# Patient Record
Sex: FEMALE | Race: BLACK OR AFRICAN AMERICAN | NOT HISPANIC OR LATINO | Employment: FULL TIME | URBAN - METROPOLITAN AREA
[De-identification: names, ages, dates, MRNs, and addresses within clinical notes are randomized per-mention and may not be internally consistent; named-entity substitution may affect disease eponyms.]

---

## 2023-03-15 ENCOUNTER — LAB REQUISITION (OUTPATIENT)
Dept: LAB | Facility: CLINIC | Age: 23
End: 2023-03-15
Payer: COMMERCIAL

## 2023-03-15 DIAGNOSIS — Z01.419 ENCOUNTER FOR GYNECOLOGICAL EXAMINATION (GENERAL) (ROUTINE) WITHOUT ABNORMAL FINDINGS: ICD-10-CM

## 2023-03-15 LAB
ALBUMIN SERPL BCG-MCNC: 4.2 G/DL (ref 3.5–5.2)
ALP SERPL-CCNC: 91 U/L (ref 35–104)
ALT SERPL W P-5'-P-CCNC: 13 U/L (ref 10–35)
ANION GAP SERPL CALCULATED.3IONS-SCNC: 14 MMOL/L (ref 7–15)
AST SERPL W P-5'-P-CCNC: 21 U/L (ref 10–35)
BILIRUB SERPL-MCNC: 0.3 MG/DL
BUN SERPL-MCNC: 6.5 MG/DL (ref 6–20)
CALCIUM SERPL-MCNC: 8.7 MG/DL (ref 8.6–10)
CHLORIDE SERPL-SCNC: 103 MMOL/L (ref 98–107)
CHOLEST SERPL-MCNC: 134 MG/DL
CREAT SERPL-MCNC: 0.51 MG/DL (ref 0.51–0.95)
DEPRECATED HCO3 PLAS-SCNC: 22 MMOL/L (ref 22–29)
GFR SERPL CREATININE-BSD FRML MDRD: >90 ML/MIN/1.73M2
GLUCOSE SERPL-MCNC: 93 MG/DL (ref 70–99)
HDLC SERPL-MCNC: 57 MG/DL
LDLC SERPL CALC-MCNC: 63 MG/DL
NONHDLC SERPL-MCNC: 77 MG/DL
POTASSIUM SERPL-SCNC: 3.8 MMOL/L (ref 3.4–5.3)
PROT SERPL-MCNC: 7.3 G/DL (ref 6.4–8.3)
SODIUM SERPL-SCNC: 139 MMOL/L (ref 136–145)
T4 FREE SERPL-MCNC: 1.13 NG/DL (ref 0.9–1.7)
TRIGL SERPL-MCNC: 69 MG/DL
TSH SERPL DL<=0.005 MIU/L-ACNC: 4.27 UIU/ML (ref 0.3–4.2)

## 2023-03-15 PROCEDURE — 84439 ASSAY OF FREE THYROXINE: CPT | Mod: ORL

## 2023-03-15 PROCEDURE — 84443 ASSAY THYROID STIM HORMONE: CPT

## 2023-03-15 PROCEDURE — 86481 TB AG RESPONSE T-CELL SUSP: CPT | Mod: ORL

## 2023-03-15 PROCEDURE — 80061 LIPID PANEL: CPT | Mod: ORL

## 2023-03-15 PROCEDURE — 80053 COMPREHEN METABOLIC PANEL: CPT | Mod: ORL

## 2023-03-17 LAB
GAMMA INTERFERON BACKGROUND BLD IA-ACNC: 0.05 IU/ML
M TB IFN-G BLD-IMP: NEGATIVE
M TB IFN-G CD4+ BCKGRND COR BLD-ACNC: 9.95 IU/ML
MITOGEN IGNF BCKGRD COR BLD-ACNC: 0 IU/ML
MITOGEN IGNF BCKGRD COR BLD-ACNC: 0.02 IU/ML
QUANTIFERON MITOGEN: 10 IU/ML
QUANTIFERON NIL TUBE: 0.05 IU/ML
QUANTIFERON TB1 TUBE: 0.05 IU/ML
QUANTIFERON TB2 TUBE: 0.07

## 2023-06-28 ENCOUNTER — APPOINTMENT (OUTPATIENT)
Dept: CT IMAGING | Facility: CLINIC | Age: 23
End: 2023-06-28
Attending: EMERGENCY MEDICINE
Payer: COMMERCIAL

## 2023-06-28 ENCOUNTER — HOSPITAL ENCOUNTER (OUTPATIENT)
Facility: CLINIC | Age: 23
Setting detail: OBSERVATION
Discharge: HOME | End: 2023-06-28
Attending: EMERGENCY MEDICINE | Admitting: NURSE PRACTITIONER
Payer: COMMERCIAL

## 2023-06-28 ENCOUNTER — APPOINTMENT (OUTPATIENT)
Dept: CARDIOLOGY | Facility: CLINIC | Age: 23
End: 2023-06-28
Payer: COMMERCIAL

## 2023-06-28 VITALS
HEIGHT: 69 IN | BODY MASS INDEX: 17.03 KG/M2 | DIASTOLIC BLOOD PRESSURE: 87 MMHG | RESPIRATION RATE: 16 BRPM | TEMPERATURE: 98.5 F | SYSTOLIC BLOOD PRESSURE: 105 MMHG | HEART RATE: 82 BPM | WEIGHT: 115 LBS | OXYGEN SATURATION: 100 %

## 2023-06-28 DIAGNOSIS — R40.20 LOSS OF CONSCIOUSNESS (H): ICD-10-CM

## 2023-06-28 DIAGNOSIS — S00.511A ABRASION OF LIP, INITIAL ENCOUNTER: ICD-10-CM

## 2023-06-28 DIAGNOSIS — R00.0 TACHYCARDIA, UNSPECIFIED: ICD-10-CM

## 2023-06-28 DIAGNOSIS — R55 SYNCOPE AND COLLAPSE: ICD-10-CM

## 2023-06-28 LAB
ALBUMIN SERPL BCG-MCNC: 4.8 G/DL (ref 3.5–5.2)
ALP SERPL-CCNC: 105 U/L (ref 35–104)
ALT SERPL W P-5'-P-CCNC: 22 U/L (ref 0–50)
ANION GAP SERPL CALCULATED.3IONS-SCNC: 21 MMOL/L (ref 7–15)
ANION GAP SERPL CALCULATED.3IONS-SCNC: 8 MMOL/L (ref 7–15)
AST SERPL W P-5'-P-CCNC: 23 U/L (ref 0–45)
ATRIAL RATE - MUSE: 113 BPM
B-OH-BUTYR SERPL-SCNC: <0.18 MMOL/L
BASOPHILS # BLD AUTO: 0 10E3/UL (ref 0–0.2)
BASOPHILS NFR BLD AUTO: 0 %
BILIRUB SERPL-MCNC: 0.4 MG/DL
BUN SERPL-MCNC: 10.4 MG/DL (ref 6–20)
BUN SERPL-MCNC: 12.5 MG/DL (ref 6–20)
CALCIUM SERPL-MCNC: 8.1 MG/DL (ref 8.6–10)
CALCIUM SERPL-MCNC: 9.2 MG/DL (ref 8.6–10)
CHLORIDE SERPL-SCNC: 100 MMOL/L (ref 98–107)
CHLORIDE SERPL-SCNC: 107 MMOL/L (ref 98–107)
CREAT SERPL-MCNC: 0.59 MG/DL (ref 0.51–0.95)
CREAT SERPL-MCNC: 0.64 MG/DL (ref 0.51–0.95)
DEPRECATED HCO3 PLAS-SCNC: 17 MMOL/L (ref 22–29)
DEPRECATED HCO3 PLAS-SCNC: 23 MMOL/L (ref 22–29)
DIASTOLIC BLOOD PRESSURE - MUSE: NORMAL MMHG
EOSINOPHIL # BLD AUTO: 0 10E3/UL (ref 0–0.7)
EOSINOPHIL NFR BLD AUTO: 0 %
ERYTHROCYTE [DISTWIDTH] IN BLOOD BY AUTOMATED COUNT: 13.2 % (ref 10–15)
GFR SERPL CREATININE-BSD FRML MDRD: >90 ML/MIN/1.73M2
GFR SERPL CREATININE-BSD FRML MDRD: >90 ML/MIN/1.73M2
GLUCOSE BLDC GLUCOMTR-MCNC: 105 MG/DL (ref 70–99)
GLUCOSE SERPL-MCNC: 105 MG/DL (ref 70–99)
GLUCOSE SERPL-MCNC: 84 MG/DL (ref 70–99)
HCG SERPL QL: NEGATIVE
HCT VFR BLD AUTO: 44.1 % (ref 35–47)
HGB BLD-MCNC: 14.4 G/DL (ref 11.7–15.7)
HOLD SPECIMEN: NORMAL
HOLD SPECIMEN: NORMAL
IMM GRANULOCYTES # BLD: 0 10E3/UL
IMM GRANULOCYTES NFR BLD: 0 %
INTERPRETATION ECG - MUSE: NORMAL
LACTATE SERPL-SCNC: 1.9 MMOL/L (ref 0.7–2)
LVEF ECHO: NORMAL
LYMPHOCYTES # BLD AUTO: 2.8 10E3/UL (ref 0.8–5.3)
LYMPHOCYTES NFR BLD AUTO: 32 %
MAGNESIUM SERPL-MCNC: 1.9 MG/DL (ref 1.7–2.3)
MCH RBC QN AUTO: 29.3 PG (ref 26.5–33)
MCHC RBC AUTO-ENTMCNC: 32.7 G/DL (ref 31.5–36.5)
MCV RBC AUTO: 90 FL (ref 78–100)
MONOCYTES # BLD AUTO: 0.4 10E3/UL (ref 0–1.3)
MONOCYTES NFR BLD AUTO: 4 %
NEUTROPHILS # BLD AUTO: 5.5 10E3/UL (ref 1.6–8.3)
NEUTROPHILS NFR BLD AUTO: 64 %
NRBC # BLD AUTO: 0 10E3/UL
NRBC BLD AUTO-RTO: 0 /100
P AXIS - MUSE: 70 DEGREES
PLATELET # BLD AUTO: 225 10E3/UL (ref 150–450)
POTASSIUM SERPL-SCNC: 3.7 MMOL/L (ref 3.4–5.3)
POTASSIUM SERPL-SCNC: 3.8 MMOL/L (ref 3.4–5.3)
PR INTERVAL - MUSE: 132 MS
PROT SERPL-MCNC: 8.1 G/DL (ref 6.4–8.3)
QRS DURATION - MUSE: 76 MS
QT - MUSE: 330 MS
QTC - MUSE: 452 MS
R AXIS - MUSE: 70 DEGREES
RBC # BLD AUTO: 4.92 10E6/UL (ref 3.8–5.2)
SODIUM SERPL-SCNC: 138 MMOL/L (ref 136–145)
SODIUM SERPL-SCNC: 138 MMOL/L (ref 136–145)
SYSTOLIC BLOOD PRESSURE - MUSE: NORMAL MMHG
T AXIS - MUSE: 62 DEGREES
TROPONIN T SERPL HS-MCNC: <6 NG/L
VENTRICULAR RATE- MUSE: 113 BPM
WBC # BLD AUTO: 8.7 10E3/UL (ref 4–11)

## 2023-06-28 PROCEDURE — G0378 HOSPITAL OBSERVATION PER HR: HCPCS

## 2023-06-28 PROCEDURE — 82010 KETONE BODYS QUAN: CPT | Performed by: EMERGENCY MEDICINE

## 2023-06-28 PROCEDURE — 258N000003 HC RX IP 258 OP 636: Performed by: EMERGENCY MEDICINE

## 2023-06-28 PROCEDURE — 36415 COLL VENOUS BLD VENIPUNCTURE: CPT | Performed by: EMERGENCY MEDICINE

## 2023-06-28 PROCEDURE — 83735 ASSAY OF MAGNESIUM: CPT | Performed by: EMERGENCY MEDICINE

## 2023-06-28 PROCEDURE — 83605 ASSAY OF LACTIC ACID: CPT | Performed by: EMERGENCY MEDICINE

## 2023-06-28 PROCEDURE — 99222 1ST HOSP IP/OBS MODERATE 55: CPT | Performed by: PSYCHIATRY & NEUROLOGY

## 2023-06-28 PROCEDURE — 99284 EMERGENCY DEPT VISIT MOD MDM: CPT | Mod: 25 | Performed by: EMERGENCY MEDICINE

## 2023-06-28 PROCEDURE — 93010 ELECTROCARDIOGRAM REPORT: CPT | Performed by: EMERGENCY MEDICINE

## 2023-06-28 PROCEDURE — 84484 ASSAY OF TROPONIN QUANT: CPT | Performed by: EMERGENCY MEDICINE

## 2023-06-28 PROCEDURE — 85025 COMPLETE CBC W/AUTO DIFF WBC: CPT | Performed by: EMERGENCY MEDICINE

## 2023-06-28 PROCEDURE — 250N000013 HC RX MED GY IP 250 OP 250 PS 637: Performed by: FAMILY MEDICINE

## 2023-06-28 PROCEDURE — 93005 ELECTROCARDIOGRAM TRACING: CPT | Performed by: EMERGENCY MEDICINE

## 2023-06-28 PROCEDURE — 80053 COMPREHEN METABOLIC PANEL: CPT | Performed by: EMERGENCY MEDICINE

## 2023-06-28 PROCEDURE — 82962 GLUCOSE BLOOD TEST: CPT

## 2023-06-28 PROCEDURE — 96361 HYDRATE IV INFUSION ADD-ON: CPT | Performed by: EMERGENCY MEDICINE

## 2023-06-28 PROCEDURE — 70450 CT HEAD/BRAIN W/O DYE: CPT

## 2023-06-28 PROCEDURE — 93306 TTE W/DOPPLER COMPLETE: CPT

## 2023-06-28 PROCEDURE — 96360 HYDRATION IV INFUSION INIT: CPT | Performed by: EMERGENCY MEDICINE

## 2023-06-28 PROCEDURE — 93306 TTE W/DOPPLER COMPLETE: CPT | Mod: 26 | Performed by: INTERNAL MEDICINE

## 2023-06-28 PROCEDURE — 99285 EMERGENCY DEPT VISIT HI MDM: CPT | Mod: 25 | Performed by: EMERGENCY MEDICINE

## 2023-06-28 PROCEDURE — 84703 CHORIONIC GONADOTROPIN ASSAY: CPT | Performed by: EMERGENCY MEDICINE

## 2023-06-28 RX ORDER — ACETAMINOPHEN 500 MG
1000 TABLET ORAL ONCE
Status: COMPLETED | OUTPATIENT
Start: 2023-06-28 | End: 2023-06-28

## 2023-06-28 RX ORDER — ACETAMINOPHEN 650 MG/1
650 SUPPOSITORY RECTAL EVERY 4 HOURS PRN
Status: CANCELLED | OUTPATIENT
Start: 2023-06-28

## 2023-06-28 RX ORDER — ACETAMINOPHEN 325 MG/1
650 TABLET ORAL EVERY 4 HOURS PRN
Status: CANCELLED | OUTPATIENT
Start: 2023-06-28

## 2023-06-28 RX ORDER — SODIUM CHLORIDE 9 MG/ML
INJECTION, SOLUTION INTRAVENOUS CONTINUOUS
Status: CANCELLED | OUTPATIENT
Start: 2023-06-28

## 2023-06-28 RX ORDER — NITROGLYCERIN 0.4 MG/1
0.4 TABLET SUBLINGUAL EVERY 5 MIN PRN
Status: CANCELLED | OUTPATIENT
Start: 2023-06-28

## 2023-06-28 RX ORDER — ONDANSETRON 4 MG/1
4 TABLET, ORALLY DISINTEGRATING ORAL EVERY 6 HOURS PRN
Status: CANCELLED | OUTPATIENT
Start: 2023-06-28

## 2023-06-28 RX ORDER — LIDOCAINE 40 MG/G
CREAM TOPICAL
Status: CANCELLED | OUTPATIENT
Start: 2023-06-28

## 2023-06-28 RX ORDER — ONDANSETRON 2 MG/ML
4 INJECTION INTRAMUSCULAR; INTRAVENOUS EVERY 6 HOURS PRN
Status: CANCELLED | OUTPATIENT
Start: 2023-06-28

## 2023-06-28 RX ADMIN — SODIUM CHLORIDE 1000 ML: 9 INJECTION, SOLUTION INTRAVENOUS at 03:26

## 2023-06-28 RX ADMIN — ACETAMINOPHEN 1000 MG: 500 TABLET ORAL at 13:22

## 2023-06-28 ASSESSMENT — ACTIVITIES OF DAILY LIVING (ADL)
ADLS_ACUITY_SCORE: 35

## 2023-06-28 NOTE — ED PROVIDER NOTES
"     Emergency Department Patient Sign-out       Brief HPI:  This is a 23 year old female signed out to me by Dr. Hurtado .  See initial ED Provider note for details of the presentation.            Significant Events prior to my assuming care: 23-year-old female who presented on the earlier shift due to an episode of transient loss of consciousness of undefined etiology.  Work-up to date is negative.  Plan for the patient to be admitted to the ED observation unit for telemetry, echocardiogram and neurology consult.  She is boarding in the [place] ED while awaiting transfer to the [facility] ED observation unit.      Exam:   Patient Vitals for the past 24 hrs:   BP Temp Temp src Pulse Resp SpO2 Height Weight   06/28/23 0700 -- -- -- 85 15 100 % -- --   06/28/23 0655 98/59 -- -- 85 14 100 % -- --   06/28/23 0650 -- -- -- 87 15 100 % -- --   06/28/23 0645 -- -- -- 89 15 100 % -- --   06/28/23 0640 98/61 -- -- 85 14 100 % -- --   06/28/23 0635 -- -- -- 86 14 100 % -- --   06/28/23 0630 -- -- -- 89 14 100 % -- --   06/28/23 0625 101/59 -- -- 86 14 100 % -- --   06/28/23 0615 (!) 125/116 -- -- 91 15 100 % -- --   06/28/23 0438 107/70 -- -- 97 18 99 % -- --   06/28/23 0423 112/77 -- -- 91 15 100 % -- --   06/28/23 0345 108/80 -- -- 88 17 90 % -- --   06/28/23 0327 110/75 -- -- 90 17 100 % -- --   06/28/23 0234 -- 98.5  F (36.9  C) Oral -- -- -- -- --   06/28/23 0231 -- -- -- -- -- -- 1.753 m (5' 9\") 52.2 kg (115 lb)   06/28/23 0229 -- -- -- 113 21 -- -- --   06/28/23 0224 114/51 -- -- (!) 147 18 98 % -- --   06/28/23 0219 120/81 -- -- 114 18 -- -- --           ED RESULTS:   Results for orders placed or performed during the hospital encounter of 06/28/23 (from the past 24 hour(s))   Glucose by meter     Status: Abnormal    Collection Time: 06/28/23  2:15 AM   Result Value Ref Range    GLUCOSE BY METER POCT 105 (H) 70 - 99 mg/dL   [place] Draw     Status: None (In process)    Collection Time: 06/28/23  2:18 AM    Narrative    The " following orders were created for panel order [place] Draw.  Procedure                               Abnormality         Status                     ---------                               -----------         ------                     Extra Blue Top Tube[[id number]]                              Final result               Extra Red Top Tube[[id number]]                                                          Extra Green Top (Lithium...[[id number]]                                                 Extra Purple Top Tube[[id number]]                                                         Please view results for these tests on the individual orders.   Extra Blue Top Tube     Status: None    Collection Time: 06/28/23  2:18 AM   Result Value Ref Range    Hold Specimen [facility]    CBC with platelets differential     Status: None    Collection Time: 06/28/23  2:19 AM    Narrative    The following orders were created for panel order CBC with platelets differential.  Procedure                               Abnormality         Status                     ---------                               -----------         ------                     CBC with platelets and d...[[id number]]                      Final result                 Please view results for these tests on the individual orders.   Comprehensive metabolic panel     Status: Abnormal    Collection Time: 06/28/23  2:19 AM   Result Value Ref Range    Sodium 138 136 - 145 mmol/L    Potassium 3.7 3.4 - 5.3 mmol/L    Chloride 100 98 - 107 mmol/L    Carbon Dioxide (CO2) 17 (L) 22 - 29 mmol/L    Anion Gap 21 (H) 7 - 15 mmol/L    Urea Nitrogen 12.5 6.0 - 20.0 mg/dL    Creatinine 0.64 0.51 - 0.95 mg/dL    Calcium 9.2 8.6 - 10.0 mg/dL    Glucose 105 (H) 70 - 99 mg/dL    Alkaline Phosphatase 105 (H) 35 - 104 U/L    AST 23 0 - 45 U/L    ALT 22 0 - 50 U/L    Protein Total 8.1 6.4 - 8.3 g/dL    Albumin 4.8 3.5 - 5.2 g/dL    Bilirubin Total 0.4 <=1.2 mg/dL    GFR Estimate >90 >60 mL/min/1.73m2    Magnesium     Status: Normal    Collection Time: 06/28/23  2:19 AM   Result Value Ref Range    Magnesium 1.9 1.7 - 2.3 mg/dL   HCG qualitative pregnancy (blood)     Status: Normal    Collection Time: 06/28/23  2:19 AM   Result Value Ref Range    hCG Serum Qualitative Negative Negative   Troponin T, High Sensitivity     Status: Normal    Collection Time: 06/28/23  2:19 AM   Result Value Ref Range    Troponin T, High Sensitivity <6 <=14 ng/L   CBC with platelets and differential     Status: None    Collection Time: 06/28/23  2:19 AM   Result Value Ref Range    WBC Count 8.7 4.0 - 11.0 10e3/uL    RBC Count 4.92 3.80 - 5.20 10e6/uL    Hemoglobin 14.4 11.7 - 15.7 g/dL    Hematocrit 44.1 35.0 - 47.0 %    MCV 90 78 - 100 fL    MCH 29.3 26.5 - 33.0 pg    MCHC 32.7 31.5 - 36.5 g/dL    RDW 13.2 10.0 - 15.0 %    Platelet Count 225 150 - 450 10e3/uL    % Neutrophils 64 %    % Lymphocytes 32 %    % Monocytes 4 %    % Eosinophils 0 %    % Basophils 0 %    % Immature Granulocytes 0 %    NRBCs per 100 WBC 0 <1 /100    Absolute Neutrophils 5.5 1.6 - 8.3 10e3/uL    Absolute Lymphocytes 2.8 0.8 - 5.3 10e3/uL    Absolute Monocytes 0.4 0.0 - 1.3 10e3/uL    Absolute Eosinophils 0.0 0.0 - 0.7 10e3/uL    Absolute Basophils 0.0 0.0 - 0.2 10e3/uL    Absolute Immature Granulocytes 0.0 <=0.4 10e3/uL    Absolute NRBCs 0.0 10e3/uL   Ketone Beta-Hydroxybutyrate Quantitative     Status: Normal    Collection Time: 06/28/23  2:19 AM   Result Value Ref Range    Ketone (Beta-Hydroxybutyrate) Quantitative <0.18 <=0.30 mmol/L   EKG 12 lead     Status: None    Collection Time: 06/28/23  2:27 AM   Result Value Ref Range    Systolic Blood Pressure  mmHg    Diastolic Blood Pressure  mmHg    Ventricular Rate 113 BPM    Atrial Rate 113 BPM    [place] Interval 132 ms    QRS Duration 76 ms     ms    QTc 452 ms    P Axis 70 degrees    R AXIS 70 degrees    T Axis 62 degrees    Interpretation ECG       Sinus tachycardia  Possible Left atrial  enlargement  Nonspecific ST abnormality  Abnormal ECG  Unconfirmed report - interpretation of this ECG is computer generated - see medical record for final interpretation  Confirmed by - EMERGENCY ROOM, PHYSICIAN ([id number]),  BYRON ABDALLA ([id number]) on 6/28/2023 7:09:22 AM     Head CT w/o contrast     Status: None    Collection Time: 06/28/23  2:59 AM    Narrative    EXAM: CT HEAD W/O CONTRAST  LOCATION: [facility]  DATE: 6/28/2023    INDICATION: Loss of consciousness. Head trauma.  COMPARISON: None.  TECHNIQUE: Routine CT Head without IV contrast. Multiplanar reformats. Dose reduction techniques were used.    FINDINGS:  INTRACRANIAL CONTENTS: No definite acute intracranial hemorrhage, extra-axial fluid collection or mass effect. Subtle hyperdensity along the extra-axial space at the anterior/inferior right lateral frontal lobe (image 13 and 14 series 4) is favored to be   related to streak artifact. No CT evidence of acute infarct. Normal parenchymal attenuation. Normal ventricles and sulci.     VISUALIZED ORBITS/SINUSES/MASTOIDS: No intraorbital abnormality. No paranasal sinus mucosal disease. No middle ear or mastoid effusion.    BONES/SOFT TISSUES: No acute abnormality.      Impression    IMPRESSION:  1.  No definite acute intracranial process.   Lactic acid whole blood     Status: Normal    Collection Time: 06/28/23  3:20 AM   Result Value Ref Range    Lactic Acid 1.9 0.7 - 2.0 mmol/L   Extra Tube     Status: None    Collection Time: 06/28/23  4:42 AM    Narrative    The following orders were created for panel order Extra Tube.  Procedure                               Abnormality         Status                     ---------                               -----------         ------                     Extra Purple Top Tube[[id number]]                            Final result                 Please view results for these tests on the individual orders.   Extra Purple  Top Tube     Status: None    Collection Time: 06/28/23  4:42 AM   Result Value Ref Range    Hold Specimen [facility]    Basic metabolic panel     Status: Abnormal    Collection Time: 06/28/23  4:55 AM   Result Value Ref Range    Sodium 138 136 - 145 mmol/L    Potassium 3.8 3.4 - 5.3 mmol/L    Chloride 107 98 - 107 mmol/L    Carbon Dioxide (CO2) 23 22 - 29 mmol/L    Anion Gap 8 7 - 15 mmol/L    Urea Nitrogen 10.4 6.0 - 20.0 mg/dL    Creatinine 0.59 0.51 - 0.95 mg/dL    Calcium 8.1 (L) 8.6 - 10.0 mg/dL    Glucose 84 70 - 99 mg/dL    GFR Estimate >90 >60 mL/min/1.73m2       ED MEDICATIONS:   Medications   0.9% sodium chloride BOLUS (0 mLs Intravenous Stopped 6/28/23 6074)         Impression:    ICD-10-CM    1. Loss of consciousness (H)  R40.20           Plan:    Pending studies include none.        MD Collette Mcfadden David, MD  06/28/23 5749

## 2023-06-28 NOTE — CONSULTS
CC:   Chief Complaint   Patient presents with     Loss of Consciousness     Pt unable to recall what happened, woke up on the floor, feeling weak. Pt reports history of anemia. Bruise noted on left cheek and lips       HPI:  Ms. Gabe Thomas is a 23 year old woman who neurology was asked to see due to concern of seizure versus syncope.  She has no known past medical history other than recurring strep throat.    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 She had no preceding symptoms she remembers. There were apparently people around her, but no report is available from bystanders. When EMS arrived, she was pale, diffusely weak, and somewhat confused. She has now recovered to her baseline.    She reports 100 pound weight loss that is intentional over the past 1-2 years. She reports little PO intake and works on her feet. She has weekly if not daily events of lightheadedness when transitioning from a seated to a standing position, sometimes she requires holding onto a nearby object for stabilization while she feels faint. She has never had orthostatic vital signs.    She denies any history of tonic-clonic movements. She never has been told to have had a seizure, and reports no febrile seizures. She has had no history of head trauma, neurosurgeries or brain/meningeal infections. She does, however, have a sister with idiopathic epilepsy.    She denies any recent changes in her daily routine, other than recent sleep deprivation. She denies any herbal substances, alternative medications, street recreational drug use, or any regular medication use other than Tylenol.    She denies any current headache, numbness, tingling, weakness, slurred speech, double vision, or difficulty with balance.      MEDS:  No current outpatient medications on file.         PROBLEM LIST:  Patient Active Problem List   Diagnosis     Loss of consciousness (H)        PMHx:  History reviewed. No pertinent past medical history.    FHx:  Sister with idiopathic epilepsy.    SHx:  Per HPI.    Physical Exam:  Neuro: Alert, awake, normal conversant and logical speech. No dysarthria. Able to stand, sit and walk without assistance. Normal optics discs. No ptosis. Equal sized pupils with equal and normal reactivity to light. Normal peripheral visual fields without visual simultagnosia. Normal saccadic initiation, velocity and amplitude. Normal appendicular muscle tone. Normal appendicular deep tendon reflexes. Normal vibratory sense in the distal limbs. Normal finger-to-nose and heel-to-shin. No tremor.    I obtained orthostatic vital signs while sitting, standing at 1 and 5 minutes, which were without any positional drop in BP or elevation in HR. This was obtained after she ate and drank.    Objective Testing:  I personally reviewed the head CT, which is unremarkable and shows no structural lesions to explain her event of loss of consciousness.  Pertinent labs: no leukocytosis; no lactic acidosis    Assessment/Plan:  Unclear whether the spell represented a seizure or a syncopal episode.   She certainly has regularly occurring lightheadedness associated with a 100 pound weight loss (reportedly intentional) and decreased PO intake, and there is a report that she appeared pale when EMS arrived. Thus, orthostasis and loss of consciousness is in my opinion the leading item on the differential. However, it is impossible to rule out seizure, and she has a family history of a 1st degree relative with idiopathic epilepsy, making seizure a possibility. She is back to her baseline and has a normal neurological exam, thus outpatient work-up with a routine EEG is a reasonable next step.     Neurology will sign off but please let me know if any other questions arise.    Patient was given standard [place] driving  restriction instructions in patients with undetermined cause of LOC.    Today, I spent 65 minutes reviewing the chart, personally assessing objective testing, direct patient care, completing documentation and billing.

## 2023-06-28 NOTE — ED NOTES
Patient was discharged with paperwork and work note, explained mychart to patient. Vitals stable. No questions or concerns.

## 2023-06-28 NOTE — ED PROVIDER NOTES
Emergency Department Patient Sign-out       Brief HPI and ED course:  Patient is a 23 year old female signed out to me by the previous physician.  See initial ED Provider note for details of the presentation. In brief, 23-year-old female who initially presented to the emergency department due to a syncopal event/Loss of consciousness of undefined etiology was boarding in the ED for evaluation with telemetry, pending echocardiogram and neurology consult.  Echocardiogram was obtained which was normal, and just after signout to myself, neurology evaluated the patient at bedside.    They believe this is more likely loss of consciousness due to syncope rather than a seizure.  However patient does have a history of epilepsy in her sister.  They do recommend outpatient neurology follow-up, but at this point in time do not believe she needs to stay in the hospital any further.  Patient is feeling better and requesting discharge home.  She does have an abrasion on her lip but no other significant traumatic injuries that need to be addressed at this time.  No sutures required in her lip abrasion.    I have concerns that some of this is due to her significant weight loss in the last year.  Patient endorses an intentional 100 pound weight loss.  She is on the thinner side in general at this point, and I do have concerns that she could be having some over restrictive eating.  Discussed this with the patient and she states her understanding and states she will start eating some more.  We will plan for her to follow-up with primary care as she does not have 1, and I have concerns with how significant this weight loss has been for possible disordered eating    Vitals:   Patient Vitals for the past 24 hrs:   BP Temp Temp src Pulse Resp SpO2 Height Weight   06/28/23 1400 -- -- -- 75 13 -- -- --   06/28/23 1230 102/68 -- -- 83 16 100 % -- --   06/28/23 1115 -- -- -- 75 13 100 % -- --   06/28/23 0745 110/60 -- -- 91 14 100 % -- --  "  06/28/23 0700 -- -- -- 85 15 100 % -- --   06/28/23 0655 98/59 -- -- 85 14 100 % -- --   06/28/23 0650 -- -- -- 87 15 100 % -- --   06/28/23 0645 -- -- -- 89 15 100 % -- --   06/28/23 0640 98/61 -- -- 85 14 100 % -- --   06/28/23 0635 -- -- -- 86 14 100 % -- --   06/28/23 0630 -- -- -- 89 14 100 % -- --   06/28/23 0625 101/59 -- -- 86 14 100 % -- --   06/28/23 0615 (!) 125/116 -- -- 91 15 100 % -- --   06/28/23 0438 107/70 -- -- 97 18 99 % -- --   06/28/23 0423 112/77 -- -- 91 15 100 % -- --   06/28/23 0345 108/80 -- -- 88 17 90 % -- --   06/28/23 0327 110/75 -- -- 90 17 100 % -- --   06/28/23 0234 -- 98.5  F (36.9  C) Oral -- -- -- -- --   06/28/23 0231 -- -- -- -- -- -- 1.753 m (5' 9\") 52.2 kg (115 lb)   06/28/23 0229 -- -- -- 113 21 -- -- --   06/28/23 0224 114/51 -- -- (!) 147 18 98 % -- --   06/28/23 0219 120/81 -- -- 114 18 -- -- --     --  Sue Yusuf MD   Emergency Medicine       Sue Yusuf MD  06/28/23 0734    "

## 2023-06-28 NOTE — LETTER
[facility] EMERGENCY DEPARTMENT  [address]  [facility] [address]  [phone]      June 28, 2023      To Whom it may concern:    Gabe Thomas was seen in our Emergency Department today, June 28, 2023 for an injury that was reported to be work related.      For the next 1 days she should not work    The employee might be taking medication so that they cannot operate moving machinery or perform activities that require balancing or working above ground.      Sincerely,    El Allen RN

## 2023-06-28 NOTE — ED PROVIDER NOTES
ED Provider Note  [facility]      History     Chief Complaint   Patient presents with     Loss of Consciousness     Pt unable to recall what happened, woke up on the floor, feeling weak. Pt reports history of anemia. 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  She states that she was feeling her normal state of health, working, and the next thing she knew she woke up with a bunch of people around her asking for a wheelchair.  She states that she did not have any warning that she was going to fall down.  She does not recall for sure what happened.  She did not lose continence.  The provider that responded noted that she seemed confused at the scene, seemed weak, was unable to stand on her own.  She states that she is got a little bit of pain in her left lower lip and left cheek, but denies any blurred vision, trouble with her speech, numbness, tingling, weakness.  Denies any chest pain, shortness of breath, cough, fever, abdominal pain, nausea, vomiting, diarrhea.  No neck or back pain.  No extremity pain.  She reports she has passed out before, and she believes it is related to anemia.    Per chart review, Tetanus is UTD.    Past Medical History  History reviewed. No pertinent past medical history.  History reviewed. No pertinent surgical history.  No current outpatient medications on file.    No Known Allergies  Family History  History reviewed. No pertinent family history.  Social History   Social History     Tobacco Use     Smoking status: Never     Smokeless tobacco: Never   Substance Use Topics     Alcohol use: Never     Drug use: Never         A medically appropriate review of systems was performed with pertinent positives and negatives noted in the HPI, and all other systems negative.    Physical Exam   BP:  "120/81  Pulse: 114  Temp: 98.5  F (36.9  C)  Resp: 18  Height: 175.3 cm (5' 9\")  Weight: 52.2 kg (115 lb)  SpO2: 98 %  Physical Exam  Constitutional:       General: She is not in acute distress.     Appearance: Normal appearance. She is not toxic-appearing.   HENT:      Head:        Mouth/Throat:      Mouth: Mucous membranes are moist.   Eyes:      General: No scleral icterus.     Conjunctiva/sclera: Conjunctivae normal.   Neck:      Comments: No midline C/T/L spine tenderness  Cardiovascular:      Rate and Rhythm: Normal rate.      Heart sounds: Normal heart sounds.   Pulmonary:      Effort: No respiratory distress.      Breath sounds: Normal breath sounds.   Abdominal:      Palpations: Abdomen is soft.      Tenderness: There is no abdominal tenderness.   Musculoskeletal:         General: No deformity.      Cervical back: Neck supple.   Skin:     General: Skin is warm.      Findings: No rash.   Neurological:      General: No focal deficit present.      Mental Status: She is alert and oriented to person, place, and time.      Cranial Nerves: No cranial nerve deficit.      Sensory: No sensory deficit.      Motor: No weakness.         ED Course, Procedures, & Data      Procedures       ED Course Selections:        EKG Interpretation:      Interpreted by Aishwarya Hurtado MD  Time reviewed: 0230  Symptoms at time of EKG: None   Rhythm: sinus tachycardia  Rate: 113  Axis: Normal  Ectopy: none  Conduction: normal  ST Segments/ T Waves: Non-specific ST wave changes  Q Waves: none  Comparison to prior: No old EKG available    Clinical Impression: NSR with non-specific ST changes                           Results for orders placed or performed during the hospital encounter of 06/28/23   Head CT w/o contrast     Status: None    Narrative    EXAM: CT HEAD W/O CONTRAST  LOCATION: [facility]  DATE: 6/28/2023    INDICATION: Loss of consciousness. Head trauma.  COMPARISON: " None.  TECHNIQUE: Routine CT Head without IV contrast. Multiplanar reformats. Dose reduction techniques were used.    FINDINGS:  INTRACRANIAL CONTENTS: No definite acute intracranial hemorrhage, extra-axial fluid collection or mass effect. Subtle hyperdensity along the extra-axial space at the anterior/inferior right lateral frontal lobe (image 13 and 14 series 4) is favored to be   related to streak artifact. No CT evidence of acute infarct. Normal parenchymal attenuation. Normal ventricles and sulci.     VISUALIZED ORBITS/SINUSES/MASTOIDS: No intraorbital abnormality. No paranasal sinus mucosal disease. No middle ear or mastoid effusion.    BONES/SOFT TISSUES: No acute abnormality.      Impression    IMPRESSION:  1.  No definite acute intracranial process.   [place] Draw     Status: None (In process)    Narrative    The following orders were created for panel order [place] Draw.  Procedure                               Abnormality         Status                     ---------                               -----------         ------                     Extra Blue Top Tube[[id number]]                              Final result               Extra Red Top Tube[[id number]]                                                          Extra Green Top (Lithium...[[id number]]                                                 Extra Purple Top Tube[[id number]]                                                         Please view results for these tests on the individual orders.   Comprehensive metabolic panel     Status: Abnormal   Result Value Ref Range    Sodium 138 136 - 145 mmol/L    Potassium 3.7 3.4 - 5.3 mmol/L    Chloride 100 98 - 107 mmol/L    Carbon Dioxide (CO2) 17 (L) 22 - 29 mmol/L    Anion Gap 21 (H) 7 - 15 mmol/L    Urea Nitrogen 12.5 6.0 - 20.0 mg/dL    Creatinine 0.64 0.51 - 0.95 mg/dL    Calcium 9.2 8.6 - 10.0 mg/dL    Glucose 105 (H) 70 - 99 mg/dL    Alkaline Phosphatase 105 (H) 35 - 104 U/L    AST 23 0 - 45 U/L    ALT  22 0 - 50 U/L    Protein Total 8.1 6.4 - 8.3 g/dL    Albumin 4.8 3.5 - 5.2 g/dL    Bilirubin Total 0.4 <=1.2 mg/dL    GFR Estimate >90 >60 mL/min/1.73m2   Magnesium     Status: Normal   Result Value Ref Range    Magnesium 1.9 1.7 - 2.3 mg/dL   HCG qualitative pregnancy (blood)     Status: Normal   Result Value Ref Range    hCG Serum Qualitative Negative Negative   Troponin T, High Sensitivity     Status: Normal   Result Value Ref Range    Troponin T, High Sensitivity <6 <=14 ng/L   Extra Blue Top Tube     Status: None   Result Value Ref Range    Hold Specimen [facility]    CBC with platelets and differential     Status: None   Result Value Ref Range    WBC Count 8.7 4.0 - 11.0 10e3/uL    RBC Count 4.92 3.80 - 5.20 10e6/uL    Hemoglobin 14.4 11.7 - 15.7 g/dL    Hematocrit 44.1 35.0 - 47.0 %    MCV 90 78 - 100 fL    MCH 29.3 26.5 - 33.0 pg    MCHC 32.7 31.5 - 36.5 g/dL    RDW 13.2 10.0 - 15.0 %    Platelet Count 225 150 - 450 10e3/uL    % Neutrophils 64 %    % Lymphocytes 32 %    % Monocytes 4 %    % Eosinophils 0 %    % Basophils 0 %    % Immature Granulocytes 0 %    NRBCs per 100 WBC 0 <1 /100    Absolute Neutrophils 5.5 1.6 - 8.3 10e3/uL    Absolute Lymphocytes 2.8 0.8 - 5.3 10e3/uL    Absolute Monocytes 0.4 0.0 - 1.3 10e3/uL    Absolute Eosinophils 0.0 0.0 - 0.7 10e3/uL    Absolute Basophils 0.0 0.0 - 0.2 10e3/uL    Absolute Immature Granulocytes 0.0 <=0.4 10e3/uL    Absolute NRBCs 0.0 10e3/uL   Glucose by meter     Status: Abnormal   Result Value Ref Range    GLUCOSE BY METER POCT 105 (H) 70 - 99 mg/dL   Lactic acid whole blood     Status: Normal   Result Value Ref Range    Lactic Acid 1.9 0.7 - 2.0 mmol/L   Ketone Beta-Hydroxybutyrate Quantitative     Status: Normal   Result Value Ref Range    Ketone (Beta-Hydroxybutyrate) Quantitative <0.18 <=0.30 mmol/L   Basic metabolic panel     Status: Abnormal   Result Value Ref Range    Sodium 138 136 - 145 mmol/L    Potassium 3.8 3.4 - 5.3 mmol/L    Chloride 107 98 - 107 mmol/L     Carbon Dioxide (CO2) 23 22 - 29 mmol/L    Anion Gap 8 7 - 15 mmol/L    Urea Nitrogen 10.4 6.0 - 20.0 mg/dL    Creatinine 0.59 0.51 - 0.95 mg/dL    Calcium 8.1 (L) 8.6 - 10.0 mg/dL    Glucose 84 70 - 99 mg/dL    GFR Estimate >90 >60 mL/min/1.73m2   Extra Tube     Status: None    Narrative    The following orders were created for panel order Extra Tube.  Procedure                               Abnormality         Status                     ---------                               -----------         ------                     Extra Purple Top Tube[[id number]]                            Final result                 Please view results for these tests on the individual orders.   Extra Purple Top Tube     Status: None   Result Value Ref Range    Hold Specimen [facility]    EKG 12 lead     Status: None (Preliminary result)   Result Value Ref Range    Systolic Blood Pressure  mmHg    Diastolic Blood Pressure  mmHg    Ventricular Rate 113 BPM    Atrial Rate 113 BPM    IL Interval 132 ms    QRS Duration 76 ms     ms    QTc 452 ms    P Axis 70 degrees    R AXIS 70 degrees    T Axis 62 degrees    Interpretation ECG       Sinus tachycardia  Possible Left atrial enlargement  Nonspecific ST abnormality  Abnormal ECG     CBC with platelets differential     Status: None    Narrative    The following orders were created for panel order CBC with platelets differential.  Procedure                               Abnormality         Status                     ---------                               -----------         ------                     CBC with platelets and d...[[id number]]                      Final result                 Please view results for these tests on the individual orders.     Medications   0.9% sodium chloride BOLUS (0 mLs Intravenous Stopped 6/28/23 0579)     Labs Ordered and Resulted from Time of ED Arrival to Time of ED Departure   COMPREHENSIVE METABOLIC PANEL - Abnormal       Result Value    Sodium 138       Potassium 3.7      Chloride 100      Carbon Dioxide (CO2) 17 (*)     Anion Gap 21 (*)     Urea Nitrogen 12.5      Creatinine 0.64      Calcium 9.2      Glucose 105 (*)     Alkaline Phosphatase 105 (*)     AST 23      ALT 22      Protein Total 8.1      Albumin 4.8      Bilirubin Total 0.4      GFR Estimate >90     GLUCOSE BY METER - Abnormal    GLUCOSE BY METER POCT 105 (*)    BASIC METABOLIC PANEL - Abnormal    Sodium 138      Potassium 3.8      Chloride 107      Carbon Dioxide (CO2) 23      Anion Gap 8      Urea Nitrogen 10.4      Creatinine 0.59      Calcium 8.1 (*)     Glucose 84      GFR Estimate >90     MAGNESIUM - Normal    Magnesium 1.9     HCG QUALITATIVE PREGNANCY - Normal    hCG Serum Qualitative Negative     TROPONIN T, HIGH SENSITIVITY - Normal    Troponin T, High Sensitivity <6     LACTIC ACID WHOLE BLOOD - Normal    Lactic Acid 1.9     KETONE BETA-HYDROXYBUTYRATE QUANTITATIVE, RAPID - Normal    Ketone (Beta-Hydroxybutyrate) Quantitative <0.18     CBC WITH PLATELETS AND DIFFERENTIAL    WBC Count 8.7      RBC Count 4.92      Hemoglobin 14.4      Hematocrit 44.1      MCV 90      MCH 29.3      MCHC 32.7      RDW 13.2      Platelet Count 225      % Neutrophils 64      % Lymphocytes 32      % Monocytes 4      % Eosinophils 0      % Basophils 0      % Immature Granulocytes 0      NRBCs per 100 WBC 0      Absolute Neutrophils 5.5      Absolute Lymphocytes 2.8      Absolute Monocytes 0.4      Absolute Eosinophils 0.0      Absolute Basophils 0.0      Absolute Immature Granulocytes 0.0      Absolute NRBCs 0.0       Head CT w/o contrast   Final Result   IMPRESSION:   1.  No definite acute intracranial process.             Critical care was not performed.     Medical Decision Making  The patient's presentation was of moderate complexity (an acute illness with systemic symptoms).    The patient's evaluation involved:  an assessment requiring an independent historian (Rapid response provider)  ordering and/or review  of 3+ test(s) in this encounter (see separate area of note for details)    The patient's management necessitated high risk (a decision regarding hospitalization).      Assessment & Plan    Plan the patient's arrival in the ED she was mildly tachycardic, though this resolved over time with a little bit of fluid.  EKG was obtained which shows sinus tachycardia with nonspecific ST changes, no clear evidence for acute ischemia.  She had no chest symptoms such as chest pain or shortness of breath at all.  Given the lack of the symptoms and normal blood pressure here, resolution of tachycardia, my concern for PE is low.  She does have some swelling to the left side of her face with only mild discomfort.  No obvious dental injury.  Head CT was done and was negative.  No sign of any significant injury at this time.  Although this certainly could have represented a syncopal episode, given the report from the responding provider to the rapid response, the patient seemed quite confused initially, seemed weak and appeared unwell-was unable to get up on her own.  She was concerned that the patient may be postictal based on her appearance.  Indeed, initial bicarb was low and anion gap was high.  I did order a lactate subsequently, though there was a delay in drawing this, and was high normal by the time it came back.  Ketones were normal, and repeat BMP showed normal bicarb and anion gap.  I do suspect her lactate was high initially, again, concerned she may have had a seizure.  We will bring her into observation for an echocardiogram, telemetry monitoring, and neurology evaluation.  Nothing to suggest infection or acute surgical abnormality at this time.    Dictation Disclaimer: Some of this Note has been completed with voice-recognition dictation software. Although errors are generally corrected real-time, there is the potential for a rare error to be present in the completed chart.      I have reviewed the nursing notes. I  have reviewed the findings, diagnosis, plan and need for follow up with the patient.    New Prescriptions    No medications on file       Final diagnoses:   Loss of consciousness (H)       Aishwarya Hurtado  [facility] EMERGENCY DEPARTMENT  6/28/2023     Aishwarya Hurtado MD  06/28/23 0708

## 2023-06-28 NOTE — DISCHARGE INSTRUCTIONS
You were seen in the emergency department due to an episode of syncope/loss of consciousness.  We believe that this is due to you passing out, but it could be due to a seizure although this is less likely.    We recommend that you follow-up with neurology outside of the hospital, and a primary care doctor to discuss further your significant weight loss in the last year.    We do recommend that you eat more as we do have concerns that some of your dizziness is related to your low weight and low food intake.  Return to the emergency department with any worsening severe symptoms including any recurrent passing out, chest pain, shortness of breath or any other concerning symptoms    Please make an appointment to follow up with [facility] (phone: [phone]) in 7-14 days even if entirely better.

## 2023-06-28 NOTE — PHARMACY-ADMISSION MEDICATION HISTORY
Admission Medication History Completed by Pharmacy    See [facility] Admission Navigator for allergy information, preferred outpatient pharmacy, prior to admission medications and immunization status.     Medication history sources:  Surescripts; chart review; patient     Pertinent changes made to PTA medication list:  Added: none  Deleted: N/A  Changed: none    Additional medication history information:   - Patient denies taking/being prescribed prescription medications and over-the-counter (OTC) products such as vitamins, sleep aids, etc.        Prior to Admission medications    Not on File        Date completed: 06/28/23    Medication history completed by:   Tati Marion, PharmD  *[id number]

## 2023-06-28 NOTE — PROGRESS NOTES
Rapid Response Team Note    Assessment   In assessment a rapid response was called on Gabe Thomas 23 yr old female due to reported fall/syncopal episode. 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 Upon my arrival she was sitting up in the chair with small laceration to her lower lip and bruise and scrape left side of her face. She was visibly weak. Her speech was slightly slurred, and appeared pale. She was able to stand only with assistance, and was weak needing help to get to the wheelchair.     The patient denied any medical issues other than anemia, but she did say that this had happened before. She reports she does not take iron, get iron infusions or blood transfusions. She denies any aura, or warning that this was coming and stated she felt totally fine prior to this event tonight.     She states she has mild chest pain, blurry vision, and Dizziness. Denies neck, or back pain. She did not loose function of bowel or bladder.  She does not currently take any medications.     Exam  Appearing lethargic, thin, and slightly pale   Visibly weak, unsteady gait   Pupils are equal bilateral and reactive, she did appear on my arrival to have a slightly disconjugate gaze and endorsed blurry vision. Sensation intact, moves all extremities. A&O x4.    Small laceration to the left lower lip, bruise, and scrap on the left cheek area     Plan   -  Transfer to the ER via wheelchair   -  Reassessment and plan follow-up will be performed by the Emergency room team      DUSTY Hightower CNP  [facility] Paging/Directory    Hospital Course     Sepsis Evaluation   The patient is not known to have an infection.  NO EVIDENCE OF SEPSIS at this time.  Vital sign, physical exam, and lab findings are due to Tracy Pascual .

## 2023-06-28 NOTE — ED TRIAGE NOTES
Pt at work, unable to recall incident prior to falling on the ground. Pt woke up, feeling weak, bruise on left cheek and lips     Triage Assessment     Row Name 06/28/23 0231       Triage Assessment (Adult)    Airway WDL WDL       Respiratory WDL    Respiratory WDL WDL       Skin Circulation/Temperature WDL    Skin Circulation/Temperature WDL WDL       Cardiac WDL    Cardiac WDL WDL       Peripheral/Neurovascular WDL    Peripheral Neurovascular WDL WDL       Cognitive/Neuro/Behavioral WDL    Cognitive/Neuro/Behavioral WDL WDL

## 2023-08-17 ENCOUNTER — HOSPITAL ENCOUNTER (EMERGENCY)
Facility: CLINIC | Age: 23
Discharge: HOME | End: 2023-08-17
Attending: EMERGENCY MEDICINE | Admitting: EMERGENCY MEDICINE
Payer: COMMERCIAL

## 2023-08-17 VITALS
SYSTOLIC BLOOD PRESSURE: 119 MMHG | WEIGHT: 128 LBS | TEMPERATURE: 98.4 F | HEIGHT: 69 IN | HEART RATE: 78 BPM | BODY MASS INDEX: 18.96 KG/M2 | OXYGEN SATURATION: 99 % | DIASTOLIC BLOOD PRESSURE: 79 MMHG | RESPIRATION RATE: 18 BRPM

## 2023-08-17 DIAGNOSIS — J02.9 VIRAL PHARYNGITIS: ICD-10-CM

## 2023-08-17 LAB — GROUP A STREP BY PCR: NOT DETECTED

## 2023-08-17 PROCEDURE — 99283 EMERGENCY DEPT VISIT LOW MDM: CPT | Performed by: EMERGENCY MEDICINE

## 2023-08-17 PROCEDURE — 87651 STREP A DNA AMP PROBE: CPT | Performed by: EMERGENCY MEDICINE

## 2023-08-17 RX ORDER — IBUPROFEN 400 MG/1
400 TABLET, FILM COATED ORAL
COMMUNITY
Start: 2023-03-22

## 2023-08-17 ASSESSMENT — ENCOUNTER SYMPTOMS: SORE THROAT: 1

## 2023-08-17 NOTE — ED TRIAGE NOTES
Patient presents due to sore throat with difficulty swallowing for the past 2 days; accompanied by swelling.     Triage Assessment       Row Name 08/17/23 1707       Triage Assessment (Adult)    Airway WDL WDL       Respiratory WDL    Respiratory WDL WDL       Skin Circulation/Temperature WDL    Skin Circulation/Temperature WDL WDL       Cardiac WDL    Cardiac WDL WDL       Peripheral/Neurovascular WDL    Peripheral Neurovascular WDL WDL       Cognitive/Neuro/Behavioral WDL    Cognitive/Neuro/Behavioral WDL WDL

## 2023-08-17 NOTE — DISCHARGE INSTRUCTIONS
Please make an appointment to follow up with Your Primary Care Provider in 5 days if not improving.

## 2023-08-17 NOTE — Clinical Note
Gabe Thomas was seen and treated in our emergency department on 8/17/2023.  She may return to work on 08/18/2023.       If you have any questions or concerns, please don't hesitate to call.      Dave Butler MD

## 2023-08-17 NOTE — ED PROVIDER NOTES
[facility] EMERGENCY DEPARTMENT ([facility])    8/17/23      ED PROVIDER NOTE   History     Chief Complaint   Patient presents with    Pharyngitis     Patient presents due to sore throat with difficulty swallowing for the past 2 days; accompanied by swelling.     The history is provided by the patient and medical records.     Gabe Thomas is a 23 year old female who presents with a sore throat that she has had for the last 2 days associated with some sinus congestion and runny nose.  Patient denies any productive cough or shortness of breath.  Patient states that she may have had a fever initially with her sore throat but now does not.  Patient states it still hurts when she swallows.  The patient denies vomiting, diarrhea, melena or bright blood per rectum.    Epic records reviewed.  She has had recurrent strep tonsillitis in the past with multiple episodes of strep a year.  She has been seen by [facility] to discuss a tonsillectomy and possible adenoidectomy with this.    I have reviewed the Medications, Allergies, Past Medical and Surgical History, and Social History in the Epic system.  History reviewed. No pertinent past medical history.    History reviewed. No pertinent surgical history.     Review of your medicines        UNREVIEWED medicines. Ask your doctor about these medicines        Dose / Directions   ibuprofen 400 MG tablet  Commonly known as: ADVIL/MOTRIN      Dose: 400 mg  Take 400 mg by mouth  Refills: 0            START taking        Dose / Directions   Cepastat 5-6-10 MG Lozg  Generic drug: DM-Benzocaine-Menthol      Dose: 1 lozenge  Take 1 lozenge by mouth every 2 hours as needed (sore throat)  Quantity: 15 lozenge  Refills: 0               Where to get your medicines        Some of these will need a paper prescription and others can be bought over the counter. Ask your nurse if you have questions.    Bring a paper prescription for each of these medications  Cepastat 5-6-10 MG Lozg    "      Past medical history, past surgical history, medications, and allergies were reviewed with the patient. Additional pertinent items: None    History reviewed. No pertinent family history.    Social History     Tobacco Use    Smoking status: Never    Smokeless tobacco: Never   Substance Use Topics    Alcohol use: Never     Social history was reviewed with the patient. Additional pertinent items: None    No Known Allergies    Review of Systems   HENT:  Positive for sore throat.      A complete review of systems was performed with pertinent positives and negatives noted in the HPI, and all other systems negative.    Physical Exam   BP: 119/79  Pulse: 78  Temp: 98.4  F (36.9  C)  Resp: 18  Height: 175.3 cm (5' 9\")  Weight: 58.1 kg (128 lb)  SpO2: 99 %      Physical Exam  Constitutional:       Appearance: She is not ill-appearing or diaphoretic.   HENT:      Head: Atraumatic.      Comments: No sinus tenderness to percussion     Mouth/Throat:      Pharynx: Posterior oropharyngeal erythema present.   Eyes:      Pupils: Pupils are equal, round, and reactive to light.   Neck:      Comments: Mild superior anterior cervical adenopathy  Pulmonary:      Effort: No respiratory distress.   Musculoskeletal:      Cervical back: Neck supple.   Neurological:      General: No focal deficit present.      Mental Status: She is alert and oriented to person, place, and time.   Psychiatric:         Mood and Affect: Mood normal.         ED Course        Procedures                   Results for orders placed or performed during the hospital encounter of 08/17/23 (from the past 24 hour(s))   Group A Streptococcus PCR Throat Swab    Specimen: Throat; Swab   Result Value Ref Range    Group A strep by PCR Not Detected Not Detected    Narrative    The Xpert Xpress Strep A test, performed on the [employer], is a rapid, qualitative in vitro diagnostic test for the detection of Streptococcus pyogenes (Group A ß-hemolytic " Streptococcus, Strep A) in throat swab specimens from patients with signs and symptoms of pharyngitis. The Xpert Xpress Strep A test can be used as an aid in the diagnosis of Group A Streptococcal pharyngitis. The assay is not intended to monitor treatment for Group A Streptococcus infections. The Xpert Xpress Strep A test utilizes an automated real-time polymerase chain reaction (PCR) to detect Streptococcus pyogenes DNA.              Critical care was not performed.     Medical Decision Making  The patient's presentation was of low complexity (an acute and uncomplicated illness or injury).    The patient's evaluation involved:  ordering and/or review of 1 test(s) in this encounter (see above)    The patient's management necessitated only low risk treatment.      Assessments & Plan (with Medical Decision Making)     I have reviewed the nursing notes.    Medications - No data to display     I have reviewed the findings, diagnosis, plan and need for follow up with the patient.    Discharge Medication List as of 8/17/2023  5:56 PM        START taking these medications    Details   DM-Benzocaine-Menthol (CEPASTAT) 5-6-10 MG LOZG Take 1 lozenge by mouth every 2 hours as needed (sore throat), Disp-15 lozenge, R-0, Local Print             Final diagnoses:   Viral pharyngitis     Please make an appointment to follow up with Your Primary Care Provider in 5 days if not improving.    Routine discharge instructions were given for this diagnosis      DANYELL CAMPBELL MD    8/17/2023   [facility] EMERGENCY DEPARTMENT       Danyell Campbell MD  08/17/23 3599

## 2024-05-19 ENCOUNTER — HEALTH MAINTENANCE LETTER (OUTPATIENT)
Age: 24
End: 2024-05-19

## 2024-08-20 NOTE — PROGRESS NOTES
I am seeing this patient in consultation for tonsillar hypertrophy at the request of self.    Chief Complaint - tonsillar hypertrophy    History of Present Illness - Gabe Thomas is a 24 year old female with tonsillar hypertrophy and recurrent acute tonsillitis. The patient provides the history. They describe bouts of significant sore throat with swelling of the tonsils. This happens multiple times per year, and has been going on for 4 years. Infrequent heartburn. Snores. Sleeps okay. No personal or family history of bleeding disorders. Nonsmoker.     Tests personally reviewed today for this visit:   1.) 8/17/23 strep was negative  2.) 8/2023 CBC normal    Past Medical History -   Patient Active Problem List   Diagnosis    Loss of consciousness (H)       Current Medications -   Current Outpatient Medications:     DM-Benzocaine-Menthol (CEPASTAT) 5-6-10 MG LOZG, Take 1 lozenge by mouth every 2 hours as needed (sore throat), Disp: 15 lozenge, Rfl: 0    ibuprofen (ADVIL/MOTRIN) 400 MG tablet, Take 400 mg by mouth, Disp: , Rfl:     Allergies - No Known Allergies    Social History -   Social History     Socioeconomic History    Marital status: Single   Tobacco Use    Smoking status: Never    Smokeless tobacco: Never   Substance and Sexual Activity    Alcohol use: Never    Drug use: Never    Sexual activity: Not Currently     Social Determinants of Health     Financial Resource Strain: Not on File (8/25/2019)    Received from [employer]    Financial Resource Strain     Financial Resource Strain: 0   Food Insecurity: Not on File (8/25/2019)    Received from [employer]    Food Insecurity     Food: 0   Transportation Needs: Not on File (8/25/2019)    Received from [employer]    Transportation Needs     Transportation: 0   Physical Activity: Not on File (8/25/2019)    Received from [employer]    Physical Activity     Physical Activity: 0   Stress: Not on File (8/25/2019)    Received from [employer]    Stress     Stress: 0    Received from [employer] [facility] [employer] [facility]    [employer]   Housing Stability: Not on File (2019)    Received from [facility]    [facility]     [employer]       Family History - see HPI, no bleeding disorders    Review of Systems - As per HPI and PMHx, otherwise 10+ comprehensive system review is negative.    Physical Exam  General - The patient is in no distress.  Alert and oriented, answers questions and cooperates with examination appropriately.   Voice and Breathing - The patient was breathing comfortably without the use of accessory muscles. There was no wheezing, stridor, or stertor.  The patients voice was clear and strong.  Mouth - Examination of the oral cavity showed pink, healthy oral mucosa. No lesions or ulcerations noted.  The tongue was mobile and protrudes midline.  Oropharynx - The walls of the oropharynx were smooth, pink, moist, symmetric, and had no lesions or ulcerations.  The tonsils were 2+, cryptic, no erythema today. The uvula was midline and the palate raised symmetrically.   Neck -  Soft, non-tender. Palpation of the occipital, submental, submandibular, internal jugular chain, and supraclavicular nodes did not demonstrate any abnormal lymph nodes or masses. The parotid glands were without masses. Palpation of the thyroid was soft and smooth, with no nodules or goiter appreciated.  The trachea was midline.    A/P -     ICD-10-CM    1. Acute recurrent streptococcal tonsillitis  J03.01 Case Request: TONSILLECTOMY AND POSSIBLE ADENOIDECTOMY      2. Tonsillar hypertrophy  J35.1 Case Request: TONSILLECTOMY AND POSSIBLE ADENOIDECTOMY          Gabe Thomas is a 24 year old female with recurrent acute tonsillitis. I recommend tonsillectomy, possible adenoidectomy. The remainder of the visit was spent discussing the procedure.    I explained the risks, benefits, and alternatives of tonsillectomy including, but not, limited to: bleeding, possible need to go back to the OR to control bleeding, blood  transfusion, pain, and that tonsillectomy will not cure sore throats secondary to other causes such as viral upper respiratory infection. I also discussed the risks of general anesthesia. I also explained the likely need for narcotic (opioid) pain medication that increases the risk of dependency. The patient will need to wean off the medication as soon as possible, and Tylenol and ibuprofen medication are preferred. They agree and wish to proceed. The surgical schedulers will call the patient.     Shahab Melendez MD  Otolaryngology  [facility]

## 2024-09-03 ENCOUNTER — OFFICE VISIT (OUTPATIENT)
Dept: OTOLARYNGOLOGY | Facility: CLINIC | Age: 24
End: 2024-09-03
Payer: COMMERCIAL

## 2024-09-03 VITALS
OXYGEN SATURATION: 98 % | HEART RATE: 70 BPM | RESPIRATION RATE: 16 BRPM | SYSTOLIC BLOOD PRESSURE: 111 MMHG | DIASTOLIC BLOOD PRESSURE: 77 MMHG

## 2024-09-03 DIAGNOSIS — J03.01 ACUTE RECURRENT STREPTOCOCCAL TONSILLITIS: Primary | ICD-10-CM

## 2024-09-03 DIAGNOSIS — J35.1 TONSILLAR HYPERTROPHY: ICD-10-CM

## 2024-09-03 PROCEDURE — 99204 OFFICE O/P NEW MOD 45 MIN: CPT | Performed by: OTOLARYNGOLOGY

## 2024-09-03 NOTE — LETTER
9/3/2024      Gabe Thomas  [address]  [place] [address]      Dear Colleague,    Thank you for referring your patient, Gabe Thomas, to the [facility]. Please see a copy of my visit note below.    I am seeing this patient in consultation for tonsillar hypertrophy at the request of self.    Chief Complaint - tonsillar hypertrophy    History of Present Illness - Gabe Thomas is a 24 year old female with tonsillar hypertrophy and recurrent acute tonsillitis. The patient provides the history. They describe bouts of significant sore throat with swelling of the tonsils. This happens multiple times per year, and has been going on for 4 years. Infrequent heartburn. Snores. Sleeps okay. No personal or family history of bleeding disorders. Nonsmoker.     Tests personally reviewed today for this visit:   1.) 8/17/23 strep was negative  2.) 8/2023 CBC normal    Past Medical History -   Patient Active Problem List   Diagnosis     Loss of consciousness (H)       Current Medications -   Current Outpatient Medications:      DM-Benzocaine-Menthol (CEPASTAT) 5-6-10 MG LOZG, Take 1 lozenge by mouth every 2 hours as needed (sore throat), Disp: 15 lozenge, Rfl: 0     ibuprofen (ADVIL/MOTRIN) 400 MG tablet, Take 400 mg by mouth, Disp: , Rfl:     Allergies - No Known Allergies    Social History -   Social History     Socioeconomic History     Marital status: Single   Tobacco Use     Smoking status: Never     Smokeless tobacco: Never   Substance and Sexual Activity     Alcohol use: Never     Drug use: Never     Sexual activity: Not Currently     Social Determinants of Health     Financial Resource Strain: Not on File (8/25/2019)    Received from [employer]    Financial Resource Strain      Financial Resource Strain: 0   Food Insecurity: Not on File (8/25/2019)    Received from [employer]    Food Insecurity      Food: 0   Transportation Needs: Not on File (8/25/2019)    Received from [employer]    Transportation Needs       Transportation: 0   Physical Activity: Not on File (2019)    Received from [employer]    Physical Activity      Physical Activity: 0   Stress: Not on File (2019)    Received from [employer]    Stress      Stress: 0    [facility]   Housing Stability: Not on File (2019)    Received from [employer]    Housing Stability      [employer]       Family History - see HPI, no bleeding disorders    Review of Systems - As per HPI and PMHx, otherwise 10+ comprehensive system review is negative.    Physical Exam  General - The patient is in no distress.  Alert and oriented, answers questions and cooperates with examination appropriately.   Voice and Breathing - The patient was breathing comfortably without the use of accessory muscles. There was no wheezing, stridor, or stertor.  The patients voice was clear and strong.  Mouth - Examination of the oral cavity showed pink, healthy oral mucosa. No lesions or ulcerations noted.  The tongue was mobile and protrudes midline.  Oropharynx - The walls of the oropharynx were smooth, pink, moist, symmetric, and had no lesions or ulcerations.  The tonsils were 2+, cryptic, no erythema today. The uvula was midline and the palate raised symmetrically.   Neck -  Soft, non-tender. Palpation of the occipital, submental, submandibular, internal jugular chain, and supraclavicular nodes did not demonstrate any abnormal lymph nodes or masses. The parotid glands were without masses. Palpation of the thyroid was soft and smooth, with no nodules or goiter appreciated.  The trachea was midline.    A/P -     ICD-10-CM    1. Acute recurrent streptococcal tonsillitis  J03.01 Case Request: TONSILLECTOMY AND POSSIBLE ADENOIDECTOMY      2. Tonsillar hypertrophy  J35.1 Case Request: TONSILLECTOMY AND POSSIBLE ADENOIDECTOMY          Gabe Thomas is a 24 year old female with recurrent acute tonsillitis. I recommend tonsillectomy, possible  adenoidectomy. The remainder of the visit was spent discussing the procedure.    I explained the risks, benefits, and alternatives of tonsillectomy including, but not, limited to: bleeding, possible need to go back to the OR to control bleeding, blood transfusion, pain, and that tonsillectomy will not cure sore throats secondary to other causes such as viral upper respiratory infection. I also discussed the risks of general anesthesia. I also explained the likely need for narcotic (opioid) pain medication that increases the risk of dependency. The patient will need to wean off the medication as soon as possible, and Tylenol and ibuprofen medication are preferred. They agree and wish to proceed. The surgical schedulers will call the patient.     Shahab Melendez MD  Otolaryngology  [facility]            Again, thank you for allowing me to participate in the care of your patient.        Sincerely,        Shahab Melendez MD

## 2024-09-04 ENCOUNTER — TELEPHONE (OUTPATIENT)
Dept: OTOLARYNGOLOGY | Facility: CLINIC | Age: 24
End: 2024-09-04
Payer: COMMERCIAL

## 2024-09-09 ENCOUNTER — HOSPITAL ENCOUNTER (OUTPATIENT)
Facility: AMBULATORY SURGERY CENTER | Age: 24
End: 2024-09-09
Attending: OTOLARYNGOLOGY | Admitting: OTOLARYNGOLOGY
Payer: COMMERCIAL

## 2024-09-09 PROBLEM — J35.1 TONSILLAR HYPERTROPHY: Status: ACTIVE | Noted: 2024-09-03

## 2024-09-09 PROBLEM — J03.01 ACUTE RECURRENT STREPTOCOCCAL TONSILLITIS: Status: ACTIVE | Noted: 2024-09-03

## 2024-09-09 NOTE — TELEPHONE ENCOUNTER
Type of surgery: T & A  Location of surgery: [facility]  Date and time of surgery: 10-25-24  Surgeon: Al  Pre-Op Appt Date:   Post-Op Appt Date:    Packet sent out: Yes  Pre-cert/Authorization completed:    Date:

## 2024-09-10 ENCOUNTER — HOSPITAL ENCOUNTER (EMERGENCY)
Facility: CLINIC | Age: 24
Discharge: HOME | End: 2024-09-10
Attending: EMERGENCY MEDICINE | Admitting: EMERGENCY MEDICINE
Payer: COMMERCIAL

## 2024-09-10 VITALS
RESPIRATION RATE: 16 BRPM | OXYGEN SATURATION: 99 % | HEART RATE: 79 BPM | TEMPERATURE: 98.2 F | SYSTOLIC BLOOD PRESSURE: 104 MMHG | BODY MASS INDEX: 18.9 KG/M2 | WEIGHT: 128 LBS | DIASTOLIC BLOOD PRESSURE: 73 MMHG

## 2024-09-10 DIAGNOSIS — S00.35XA: ICD-10-CM

## 2024-09-10 PROCEDURE — 99282 EMERGENCY DEPT VISIT SF MDM: CPT | Performed by: EMERGENCY MEDICINE

## 2024-09-10 RX ORDER — ESCITALOPRAM OXALATE 20 MG/1
1 TABLET ORAL
COMMUNITY
Start: 2024-07-08

## 2024-09-10 ASSESSMENT — COLUMBIA-SUICIDE SEVERITY RATING SCALE - C-SSRS
2. HAVE YOU ACTUALLY HAD ANY THOUGHTS OF KILLING YOURSELF IN THE PAST MONTH?: NO
6. HAVE YOU EVER DONE ANYTHING, STARTED TO DO ANYTHING, OR PREPARED TO DO ANYTHING TO END YOUR LIFE?: NO
1. IN THE PAST MONTH, HAVE YOU WISHED YOU WERE DEAD OR WISHED YOU COULD GO TO SLEEP AND NOT WAKE UP?: NO

## 2024-09-10 ASSESSMENT — ACTIVITIES OF DAILY LIVING (ADL): ADLS_ACUITY_SCORE: 33

## 2024-09-10 NOTE — ED TRIAGE NOTES
Triage Assessment (Adult)       Row Name 09/10/24 8912          Triage Assessment    Airway WDL WDL        Respiratory WDL    Respiratory WDL WDL        Skin Circulation/Temperature WDL    Skin Circulation/Temperature WDL WDL        Cardiac WDL    Cardiac WDL WDL        Peripheral/Neurovascular WDL    Peripheral Neurovascular WDL WDL        Cognitive/Neuro/Behavioral WDL    Cognitive/Neuro/Behavioral WDL WDL

## 2024-09-10 NOTE — ED PROVIDER NOTES
[facility] EMERGENCY DEPARTMENT ([facility])    9/10/24      ED PROVIDER NOTE    History     Chief Complaint   Patient presents with    Foreign Body in Nose     Pt here to have nose ring removed. R nostril nose ring embeded in skin.  unable to remove.      HPI  Gabe Thomas is a 24 year old female who presents to the ED with complaints of a retained nose ring.  The patient states that for the last week, she has been unable to remove her right sided nose piercing.  She states that the mucosal surface is no longer palpable or visible.  She attempted to see a  today who referred her to the emergency department as a tool were unable to remove the nose ring.  She denies any swelling, fever, or drainage.    Past Medical History  No past medical history on file.  No past surgical history on file.  escitalopram (LEXAPRO) 20 MG tablet  DM-Benzocaine-Menthol (CEPASTAT) 5-6-10 MG LOZG  ibuprofen (ADVIL/MOTRIN) 400 MG tablet      No Known Allergies  Family History  No family history on file.  Social History   Social History     Tobacco Use    Smoking status: Never    Smokeless tobacco: Never   Substance Use Topics    Alcohol use: Never    Drug use: Never      Past medical history, past surgical history, medications, allergies, family history, and social history were reviewed with the patient. No additional pertinent items.     A medically appropriate review of systems was performed with pertinent positives and negatives noted in the HPI, and all other systems negative.    Physical Exam   BP: 104/73  Pulse: 79  Temp: 98.2  F (36.8  C)  Resp: 16  Weight: 58.1 kg (128 lb)  SpO2: 99 %  Physical Exam  Vitals and nursing note reviewed.   Constitutional:       Appearance: Normal appearance.   HENT:      Nose:     Neurological:      Mental Status: She is alert.           ED Course, Procedures, & Data      Procedures                No results found for any visits on 09/10/24.  Medications - No data to display  Labs  Ordered and Resulted from Time of ED Arrival to Time of ED Departure - No data to display  No orders to display          Critical care was not performed.     Medical Decision Making  The patient's presentation was of straightforward complexity (a clearly self-limited or minor problem).    The patient's evaluation involved:  history and exam without other MDM data elements    The patient's management necessitated only low risk treatment.    Assessment & Plan    24 year old female to the emergency department with embedded nose ring.  The mucosal post and the backer are not visible despite physical manipulation of the external components of the nose ring.  Unable to remove here in the emergency department with available equipment.  Will refer to ENT for evaluation and management.  Return precautions provided.    I have reviewed the nursing notes. I have reviewed the findings, diagnosis, plan and need for follow up with the patient.    Discharge Medication List as of 9/10/2024  6:15 PM          Final diagnoses:   Foreign body in skin of nose       Chart documentation was completed with Dragon voice-recognition software. Even though reviewed, this chart may still contain some grammatical, spelling, and word errors.     [facility] EMERGENCY DEPARTMENT  9/10/2024     Demetris Funk MD  09/10/24 3525

## 2024-09-10 NOTE — DISCHARGE INSTRUCTIONS
Follow-up with ENT for nose ring removal.    [facility] (phone: [phone]).     Return if redness, swelling, drainage, fever, or other concerns.

## 2025-04-23 ENCOUNTER — HOSPITAL ENCOUNTER (EMERGENCY)
Facility: CLINIC | Age: 25
Discharge: HOME | End: 2025-04-23
Attending: FAMILY MEDICINE | Admitting: FAMILY MEDICINE
Payer: COMMERCIAL

## 2025-04-23 VITALS
WEIGHT: 150.8 LBS | OXYGEN SATURATION: 100 % | TEMPERATURE: 98.1 F | DIASTOLIC BLOOD PRESSURE: 77 MMHG | HEIGHT: 70 IN | RESPIRATION RATE: 16 BRPM | BODY MASS INDEX: 21.59 KG/M2 | HEART RATE: 80 BPM | SYSTOLIC BLOOD PRESSURE: 113 MMHG

## 2025-04-23 DIAGNOSIS — S61.451A CAT BITE OF RIGHT HAND, INITIAL ENCOUNTER: ICD-10-CM
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
## 2025-04-23 PROCEDURE — 99284 EMERGENCY DEPT VISIT MOD MDM: CPT | Mod: 25 | Performed by: FAMILY MEDICINE

## 2025-04-23 PROCEDURE — 90675 RABIES VACCINE IM: CPT | Performed by: FAMILY MEDICINE

## 2025-04-23 PROCEDURE — 90375 RABIES IG IM/SC: CPT | Performed by: FAMILY MEDICINE

## 2025-04-23 PROCEDURE — 96372 THER/PROPH/DIAG INJ SC/IM: CPT | Performed by: FAMILY MEDICINE

## 2025-04-23 PROCEDURE — 90471 IMMUNIZATION ADMIN: CPT | Performed by: FAMILY MEDICINE

## 2025-04-23 PROCEDURE — 99284 EMERGENCY DEPT VISIT MOD MDM: CPT | Performed by: FAMILY MEDICINE

## 2025-04-23 PROCEDURE — 250N000011 HC RX IP 250 OP 636: Performed by: FAMILY MEDICINE

## 2025-04-23 RX ADMIN — RABIES VIRUS STRAIN PM-1503-3M ANTIGEN (PROPIOLACTONE INACTIVATED) AND WATER 1 ML: KIT at 22:43

## 2025-04-23 RX ADMIN — RABIES IMMUNE GLOBULIN (HUMAN) 1500 UNITS: 300 INJECTION, SOLUTION INFILTRATION; INTRAMUSCULAR at 22:47

## 2025-04-23 ASSESSMENT — ACTIVITIES OF DAILY LIVING (ADL)
ADLS_ACUITY_SCORE: 41
ADLS_ACUITY_SCORE: 41

## 2025-04-24 NOTE — ED PROVIDER NOTES
"ED Provider Note  [facility] EMERGENCY DEPARTMENT ([facility])    4/23/25       ED PROVIDER NOTE     History     Chief Complaint   Patient presents with    Animal Bite     Patient said around 1900 this evening, patient saw a stray cat in her back yard. She attempted to feed the cat. The cat bit her to her right hand. 2 puncture wound noted to right hand.      HPI  Gabe Thomas is a 25 year old female who presents to the ED for evaluation of an animal bite.  Patient this evening was bit by a stray cat unknown where about the cat is does not really go to visitations.  Patient's last tetanus was 3 years ago.  Now presents valuation patient has 3 puncture bites on the dorsum of the right hand.  Notes some pain with squeezing the fist otherwise no other complaints noted no numbness distally etc.    Past Medical History  History reviewed. No pertinent past medical history.  History reviewed. No pertinent surgical history.  amoxicillin-clavulanate (AUGMENTIN) 875-125 MG tablet  DM-Benzocaine-Menthol (CEPASTAT) 5-6-10 MG LOZG  escitalopram (LEXAPRO) 20 MG tablet  ibuprofen (ADVIL/MOTRIN) 400 MG tablet      No Known Allergies  Family History  History reviewed. No pertinent family history.  Social History   Social History     Tobacco Use    Smoking status: Never    Smokeless tobacco: Never   Substance Use Topics    Alcohol use: Never    Drug use: Never      A medically appropriate review of systems was performed with pertinent positives and negatives noted in the HPI, and all other systems negative.    Physical Exam   BP: 113/77  Pulse: 80  Temp: 98.1  F (36.7  C)  Resp: 16  Height: 177.8 cm (5' 10\")  Weight: 68.4 kg (150 lb 12.8 oz)  SpO2: 100 %  Physical Exam  Vitals and nursing note reviewed.   Constitutional:       General: She is in acute distress.      Appearance: Normal appearance. She is well-developed.      Comments: Patient appropriate here in the ER   HENT:      " Head: Normocephalic and atraumatic.   Eyes:      General: No scleral icterus.     Conjunctiva/sclera: Conjunctivae normal.   Cardiovascular:      Rate and Rhythm: Normal rate.   Pulmonary:      Effort: Pulmonary effort is normal. No respiratory distress.   Abdominal:      General: Abdomen is flat.   Musculoskeletal:         General: Deformity and signs of injury present.        Hands:       Cervical back: Normal range of motion and neck supple.      Comments: 3 puncture wounds to right hand dorsum noted   Skin:     General: Skin is warm.      Capillary Refill: Capillary refill takes less than 2 seconds.      Findings: Lesion present. No rash.      Comments: 3 puncture bites from cat noted without obvious foreign body no major swelling no distal CMS compromise no major bleeding   Neurological:      General: No focal deficit present.      Mental Status: She is alert and oriented to person, place, and time. Mental status is at baseline.   Psychiatric:      Comments: Appropriate ER           ED Course, Procedures, & Data      Reviewed patient's records in epic etc. last tetanus was 2019 but patient states her last tetanus was 3 years ago.  At this point we will go with her history as she seems reliable.  I would an x-ray of the right hand looking for any further foreign body or other concerns at this point meantime we will order HyperRAB along with Imovax for vaccine post exposure prophylaxis treatment.    X-rays done interpreted by myself also no foreign body no fracture seen.  Patient updated with this wounds were cleaned here in the ER patient then given HyperRAB initially per protocol along with his first dose of the vaccine for rabies.  Patient tolerated well discussed with patient findings etc. and placed orders for then follow-up here in the ER to return on days 3 ,7 and 14 for finishing the next 3 doses of rabies vaccine.    Augmentin prescribed also.  Continue to clean wound at home bacitracin and return if any  concerns at all      Procedures                 Results for orders placed or performed during the hospital encounter of 04/23/25   XR Hand Right G/E 3 Views     Status: None    Narrative    EXAM: XR HAND RIGHT G/E 3 VIEWS  LOCATION: [facility]  DATE: 4/23/2025    INDICATION: cat bite to right hand eval for fb  COMPARISON: None.      Impression    IMPRESSION: Normal joint spaces and alignment. No fracture. No radiopaque foreign body.     Medications   rabies immune globulin 300 units/mL (HYPERAB) injection 1,500 Units (1,500 Units Intramuscular $Given 4/23/25 6193)   rabies vaccine,human diploid (IMOVAX) vaccine 1 mL (1 mL Intramuscular $Given 4/23/25 2771)     Labs Ordered and Resulted from Time of ED Arrival to Time of ED Departure - No data to display  XR Hand Right G/E 3 Views   Final Result   IMPRESSION: Normal joint spaces and alignment. No fracture. No radiopaque foreign body.             Critical care was not performed.     Medical Decision Making  The patient's presentation was of moderate complexity (an acute complicated injury).    The patient's evaluation involved:  review of external note(s) from 2 sources (see separate area of note for details)  review of 2 test result(s) ordered prior to this encounter (see separate area of note for details)  ordering and/or review of 1 test(s) in this encounter (see separate area of note for details)    The patient's management necessitated moderate risk (prescription drug management including medications given in the ED).    Assessment & Plan   25-year-old female with right hand cat bite puncture x 3 the dorsum of the right hand.  Cat is stray unknown at this point concern at this point may be a vector for rabies at this point patient here in the ER and x-ray done no foreign body seen etc. wounds were irrigated patient then given HyperRAB along with first dose of rabies vaccine.  Augmentin prescribed also patient then  recommended follow-up labs on days 3 7 and 14 for finishing the vaccine.  As noted Augmentin for infection also monitoring for any concerns of infection seeing MD return also.       I have reviewed the nursing notes. I have reviewed the findings, diagnosis, plan and need for follow up with the patient.    New Prescriptions    AMOXICILLIN-CLAVULANATE (AUGMENTIN) 875-125 MG TABLET    Take 1 tablet by mouth 2 times daily for 7 days.       Final diagnoses:   Cat bite of right hand, initial encounter         [facility] EMERGENCY DEPARTMENT  4/23/2025    This note was created at least in part by the use of dragon voice dictation system. Inadvertent typographical errors may still exist.  You Real MD.  Patient evaluated in the emergency department during the COVID-19 pandemic period. Careful attention to patients safety was addressed throughout the evaluation. Evaluation and treatment management was initiated with disposition made efficiently and appropriate as possible to minimize any risk of potential exposure to patient during this evaluation.       You Real MD  04/23/25 5861

## 2025-04-24 NOTE — ED TRIAGE NOTES
Patient said around 1900 this evening, patient saw a stray cat in her back yard. She attempted to feed the cat. The cat bit her to her right hand. 2 puncture wound noted to right hand.      Triage Assessment (Adult)       Row Name 04/23/25 3634          Triage Assessment    Airway WDL WDL        Respiratory WDL    Respiratory WDL WDL        Skin Circulation/Temperature WDL    Skin Circulation/Temperature WDL X  puncture wounds right hand        Cardiac WDL    Cardiac WDL WDL        Peripheral/Neurovascular WDL    Peripheral Neurovascular WDL WDL        Cognitive/Neuro/Behavioral WDL    Cognitive/Neuro/Behavioral WDL WDL

## 2025-04-24 NOTE — DISCHARGE INSTRUCTIONS
Home.  Your xray was OK without sign of foreign body.  Apply bacitracin to wounds daily and clean daily with soap and water.  Take the augmentin for infection.  Because the cat is a stray and unknown, there is concern for rabies.  You received the hyperrab antibody along with the first vaccine for rabies.  Recommend to return to the ER on Day 3, Day 7, and Day 14 from now for 3 more doses of the rabies vaccine.  Tylenol for pain.  Return if any concerns.

## 2025-04-26 ENCOUNTER — HOSPITAL ENCOUNTER (EMERGENCY)
Facility: CLINIC | Age: 25
Discharge: HOME | End: 2025-04-26
Admitting: STUDENT IN AN ORGANIZED HEALTH CARE EDUCATION/TRAINING PROGRAM
Payer: COMMERCIAL

## 2025-04-26 VITALS
RESPIRATION RATE: 15 BRPM | TEMPERATURE: 98.4 F | DIASTOLIC BLOOD PRESSURE: 65 MMHG | HEART RATE: 82 BPM | SYSTOLIC BLOOD PRESSURE: 113 MMHG | HEIGHT: 70 IN | WEIGHT: 150 LBS | BODY MASS INDEX: 21.47 KG/M2 | OXYGEN SATURATION: 100 %
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
## 2025-04-26 DIAGNOSIS — Z23 NEED FOR RABIES VACCINATION: ICD-10-CM

## 2025-04-26 PROCEDURE — 90471 IMMUNIZATION ADMIN: CPT | Performed by: STUDENT IN AN ORGANIZED HEALTH CARE EDUCATION/TRAINING PROGRAM

## 2025-04-26 PROCEDURE — 99284 EMERGENCY DEPT VISIT MOD MDM: CPT | Mod: 25 | Performed by: STUDENT IN AN ORGANIZED HEALTH CARE EDUCATION/TRAINING PROGRAM

## 2025-04-26 PROCEDURE — 250N000011 HC RX IP 250 OP 636: Performed by: STUDENT IN AN ORGANIZED HEALTH CARE EDUCATION/TRAINING PROGRAM

## 2025-04-26 PROCEDURE — 90675 RABIES VACCINE IM: CPT | Performed by: STUDENT IN AN ORGANIZED HEALTH CARE EDUCATION/TRAINING PROGRAM

## 2025-04-26 PROCEDURE — 99283 EMERGENCY DEPT VISIT LOW MDM: CPT | Performed by: STUDENT IN AN ORGANIZED HEALTH CARE EDUCATION/TRAINING PROGRAM

## 2025-04-26 RX ADMIN — Medication 1 ML: at 19:41

## 2025-04-26 ASSESSMENT — COLUMBIA-SUICIDE SEVERITY RATING SCALE - C-SSRS
1. IN THE PAST MONTH, HAVE YOU WISHED YOU WERE DEAD OR WISHED YOU COULD GO TO SLEEP AND NOT WAKE UP?: NO
2. HAVE YOU ACTUALLY HAD ANY THOUGHTS OF KILLING YOURSELF IN THE PAST MONTH?: NO
6. HAVE YOU EVER DONE ANYTHING, STARTED TO DO ANYTHING, OR PREPARED TO DO ANYTHING TO END YOUR LIFE?: NO

## 2025-04-27 NOTE — DISCHARGE INSTRUCTIONS
You were seen in the emergency room today for day 3 rabies vaccination following a cat bite earlier this week. It looks like the bite is healing well! You will need to return to care on day 7 (4/30/2025) and day 14 (5/7/2025) to complete the series.     Return to the emergency room with any new or worsening symptoms.

## 2025-04-27 NOTE — ED PROVIDER NOTES
"ED Provider Note  [facility]      History     Chief Complaint   Patient presents with    Animal Bite     Stray cat bite to right hand       Animal Bite    Gabe Thomas is a 25 year old female who presents to the emergency department today for rabies vaccination. She was seen in the ED 3 days ago on 4/23/2025 for evaluation after she was bit by a stray cat in her garden, unable to capture the cat after the bite, rabies status unknown. In the ED, she was given rabies immune globulin and the first dose of Imovax. She was instructed to return to care on days 3, 7, and 14 to complete rabies vaccination series. She reports today that she is feeling well. She has been taking Augmentin as prescribed. No fevers, nausea, vomiting, chest pain, shortness of breath, or other concerns. Has not noticed any drainage, swelling, or redness around the puncture wounds on her hand.     Past Medical History  No past medical history on file.  No past surgical history on file.  amoxicillin-clavulanate (AUGMENTIN) 875-125 MG tablet  DM-Benzocaine-Menthol (CEPASTAT) 5-6-10 MG LOZG  escitalopram (LEXAPRO) 20 MG tablet  ibuprofen (ADVIL/MOTRIN) 400 MG tablet      No Known Allergies  Family History  No family history on file.  Social History   Social History     Tobacco Use    Smoking status: Never    Smokeless tobacco: Never   Substance Use Topics    Alcohol use: Never    Drug use: Never      A medically appropriate review of systems was performed with pertinent positives and negatives noted in the HPI, and all other systems negative.    Physical Exam   BP: 113/65  Pulse: 82  Temp: 98.4  F (36.9  C)  Resp: 15  Height: 177.8 cm (5' 10\")  Weight: 68 kg (150 lb)  SpO2: 100 %  Physical Exam  Vitals and nursing note reviewed.   Constitutional:       Appearance: Normal appearance.   HENT:      Head: Normocephalic and atraumatic.      Mouth/Throat:      Mouth: Mucous membranes are moist.   Eyes:      Extraocular Movements: " Extraocular movements intact.      Pupils: Pupils are equal, round, and reactive to light.   Cardiovascular:      Rate and Rhythm: Normal rate and regular rhythm.   Pulmonary:      Effort: Pulmonary effort is normal. No respiratory distress.      Breath sounds: Normal breath sounds. No stridor. No wheezing, rhonchi or rales.   Chest:      Chest wall: No tenderness.   Skin:     General: Skin is warm and dry.      Capillary Refill: Capillary refill takes less than 2 seconds.      Findings: Lesion (scabbed lesion to right dorsum of hand, no surrounding erythema or edema, no drainage, no bleeding) present.   Neurological:      General: No focal deficit present.      Mental Status: She is alert and oriented to person, place, and time.   Psychiatric:         Mood and Affect: Mood normal.         Behavior: Behavior normal.         Thought Content: Thought content normal.         Judgment: Judgment normal.       ED Course, Procedures, & Data      Procedures          No results found for any visits on 04/26/25.  Medications   rabies vaccine,human diploid (IMOVAX) vaccine 1 mL (1 mL Intramuscular $Given 4/26/25 1941)     Labs Ordered and Resulted from Time of ED Arrival to Time of ED Departure - No data to display  No orders to display          Critical care was not performed.     Medical Decision Making  The patient's presentation was of low complexity (an acute and uncomplicated illness or injury).    The patient's evaluation involved:  review of external note(s) from 1 sources (recent ED note)    The patient's management necessitated moderate risk (prescription drug management including medications given in the ED).    Assessment & Plan    Gabe Thomas is a 25 year old female who presents to the emergency department today for rabies vaccination. 3 days ago was seen in the ED for evaluation after bit by a stray cat, started on rabies prophylaxis, returns today for day 3 immunization. She has no additional concerns at this  time. Vitals are in acceptable ranges, normotensive, HR 82, afebrile, normoxic. She is well appearing. Prior puncture wound appears to be healing appropriately, scab in place, no drainage, no bleeding, no surrounding erythema or edema. She was given Imovax vaccination and knows to follow-up on days 7 and 14 (4/30/2025 and 5/7/2025). Put in referrals for her to follow-up at [facility] for next two vaccinations which patient was amenable to. She had no further questions. She was discharged to home in stable condition following vaccine administration.     I have reviewed the nursing notes. I have reviewed the findings, diagnosis, plan and need for follow up with the patient.    Discharge Medication List as of 4/26/2025  7:48 PM          Final diagnoses:   Cat bite, subsequent encounter   Need for rabies vaccination       Alicia Davis PA-C   [facility] EMERGENCY DEPARTMENT  4/26/2025     Alicia Davis PA-C  04/26/25 2028

## 2025-04-27 NOTE — ED TRIAGE NOTES
Pt reports being bit by a stray cat 3 days ago on right hand, here for vaccine follow-up. Pt reports no symptoms, Pt reports cat broke skin, but bleeding was easily controlled.

## 2025-06-08 ENCOUNTER — HEALTH MAINTENANCE LETTER (OUTPATIENT)
Age: 25
End: 2025-06-08